# Patient Record
Sex: FEMALE | Race: WHITE | NOT HISPANIC OR LATINO | URBAN - METROPOLITAN AREA
[De-identification: names, ages, dates, MRNs, and addresses within clinical notes are randomized per-mention and may not be internally consistent; named-entity substitution may affect disease eponyms.]

---

## 2017-01-19 ENCOUNTER — APPOINTMENT (RX ONLY)
Dept: URBAN - METROPOLITAN AREA CLINIC 170 | Facility: CLINIC | Age: 52
Setting detail: DERMATOLOGY
End: 2017-01-19

## 2017-01-19 DIAGNOSIS — Z41.9 ENCOUNTER FOR PROCEDURE FOR PURPOSES OTHER THAN REMEDYING HEALTH STATE, UNSPECIFIED: ICD-10-CM

## 2017-01-19 NOTE — HPI: OTHER
Condition:: Filler/btx-a tx
Please Describe Your Condition:: no known contraindications to soft tissue augmentation with YARELY; no known contraindications to treatment with botulinum toxin. See \"Cosmetic Procedure\" note in Attachments.

## 2022-02-17 ENCOUNTER — APPOINTMENT (RX ONLY)
Dept: URBAN - METROPOLITAN AREA CLINIC 322 | Facility: CLINIC | Age: 57
Setting detail: DERMATOLOGY
End: 2022-02-17

## 2022-02-17 DIAGNOSIS — Z41.9 ENCOUNTER FOR PROCEDURE FOR PURPOSES OTHER THAN REMEDYING HEALTH STATE, UNSPECIFIED: ICD-10-CM

## 2022-02-17 PROCEDURE — ? ADDITIONAL NOTES

## 2022-02-17 ASSESSMENT — LOCATION ZONE DERM
LOCATION ZONE: EYELID
LOCATION ZONE: FACE

## 2022-02-17 ASSESSMENT — LOCATION DETAILED DESCRIPTION DERM
LOCATION DETAILED: LEFT SUPERIOR MEDIAL MALAR CHEEK
LOCATION DETAILED: RIGHT MEDIAL INFERIOR EYELID

## 2022-02-17 ASSESSMENT — LOCATION SIMPLE DESCRIPTION DERM
LOCATION SIMPLE: RIGHT INFERIOR EYELID
LOCATION SIMPLE: LEFT CHEEK

## 2022-02-17 NOTE — PROCEDURE: ADDITIONAL NOTES
Additional Notes: No charge today. Patient advised to schedule consult with Urbano Dean for further evaluation and treatment.
Render Risk Assessment In Note?: no
Detail Level: Simple

## 2022-02-17 NOTE — HPI: EVALUATION OF SKIN LESION(S)
What Type Of Note Output Would You Prefer (Optional)?: Standard Output
Hpi Title: Evaluation of Skin Lesions
How Severe Are Your Spot(S)?: mild
Have Your Spot(S) Been Treated In The Past?: has not been treated
Additional History: Pt noticed veins under eyes are darkening.

## 2022-05-26 ENCOUNTER — APPOINTMENT (RX ONLY)
Dept: URBAN - METROPOLITAN AREA CLINIC 173 | Facility: CLINIC | Age: 57
Setting detail: DERMATOLOGY
End: 2022-05-26

## 2022-05-26 DIAGNOSIS — Z41.9 ENCOUNTER FOR PROCEDURE FOR PURPOSES OTHER THAN REMEDYING HEALTH STATE, UNSPECIFIED: ICD-10-CM

## 2022-05-26 DIAGNOSIS — L81.1 CHLOASMA: ICD-10-CM

## 2022-05-26 PROCEDURE — ? COUNSELING

## 2022-05-26 PROCEDURE — ? COSMETIC CONSULTATION - PULSED-DYE LASER

## 2022-05-26 PROCEDURE — ? DIAGNOSIS COMMENT

## 2022-05-26 PROCEDURE — ? COSMETIC CONSULTATION: BROWN SPOTS

## 2022-05-26 ASSESSMENT — LOCATION SIMPLE DESCRIPTION DERM
LOCATION SIMPLE: LEFT FOREHEAD
LOCATION SIMPLE: RIGHT TEMPLE

## 2022-05-26 ASSESSMENT — LOCATION ZONE DERM: LOCATION ZONE: FACE

## 2022-05-26 ASSESSMENT — LOCATION DETAILED DESCRIPTION DERM
LOCATION DETAILED: LEFT SUPERIOR FOREHEAD
LOCATION DETAILED: RIGHT CENTRAL TEMPLE

## 2022-05-26 NOTE — PROCEDURE: COUNSELING
Detail Level: Zone
Patient Specific Counseling (Will Not Stick From Patient To Patient): **Patient has tried prescription bleaching creams and does not wish to pursue topical tx.

## 2022-05-26 NOTE — PROCEDURE: DIAGNOSIS COMMENT
Comment: Patient with solar lentigines diffusely on face and subtle melasma. Recommended full face 1927 (10/1/4).  Patient also with pigmentation under eye (L>R).  Discussed fraxel would also help this.  Patient has tried bleaching creams in the past which did not help. Patient may only want to treat patch on left upper forehead given this spot bothers her the most. Also discussed Nd:Yag for blue veins under eyes if this bothers patient.  Patient also with erythema on left cheek. Could treat this spot with PDL.
Render Risk Assessment In Note?: no
Detail Level: Simple

## 2022-08-25 ENCOUNTER — APPOINTMENT (RX ONLY)
Dept: URBAN - METROPOLITAN AREA CLINIC 173 | Facility: CLINIC | Age: 57
Setting detail: DERMATOLOGY
End: 2022-08-25

## 2022-08-25 DIAGNOSIS — Z41.9 ENCOUNTER FOR PROCEDURE FOR PURPOSES OTHER THAN REMEDYING HEALTH STATE, UNSPECIFIED: ICD-10-CM

## 2022-08-25 PROCEDURE — ? DIAGNOSIS COMMENT

## 2022-08-25 PROCEDURE — ? FRAXEL

## 2022-08-25 PROCEDURE — ? PULSED-DYE LASER

## 2022-08-25 PROCEDURE — ? EXCEL HR

## 2022-08-25 ASSESSMENT — LOCATION SIMPLE DESCRIPTION DERM
LOCATION SIMPLE: LEFT FOREHEAD
LOCATION SIMPLE: LEFT INFERIOR EYELID
LOCATION SIMPLE: RIGHT INFERIOR EYELID

## 2022-08-25 ASSESSMENT — LOCATION DETAILED DESCRIPTION DERM
LOCATION DETAILED: LEFT MEDIAL INFERIOR EYELID
LOCATION DETAILED: LEFT SUPERIOR FOREHEAD
LOCATION DETAILED: RIGHT MEDIAL INFERIOR EYELID

## 2022-08-25 ASSESSMENT — LOCATION ZONE DERM
LOCATION ZONE: FACE
LOCATION ZONE: EYELID

## 2022-08-25 NOTE — PROCEDURE: EXCEL HR
Total Pulses: 22
Laser Type: 1064 nm
Treatment Number: 1
Spot Size: 4 mm
Consent: Written consent obtained, risks reviewed including but not limited to crusting, scabbing, blistering, scarring, darker or lighter pigmentary change, and/or incomplete removal.
Repetition Rate: 1.5 Hz
Fluence In J/Cm2: 125
Cooling: chill tip
Number Of Prepaid Treatments (Will Not Render If 0): 0
External Cooling Fan Speed: 5
Post-Care Instructions: I reviewed with the patient in detail post-care instructions. Patient is to apply vaseline with a q-tip to all crusted areas, and avoid picking at any scabs. Pt should stay away from the sun and wear sun protection until fully healed.
Immediate Endpoint Findings: no edema or erythema
Price (Use Numbers Only, No Special Characters Or $): 150
Detail Level: Zone
Post-Procedure Text: Vaseline and ice applied. Post care reviewed with patient.
Pulse Width In Msec: 40

## 2022-08-25 NOTE — PROCEDURE: DIAGNOSIS COMMENT
Detail Level: Simple
Comment: Patient with melasma, blue vein on forehead and subtle erythema under eyes (vascular congestion) and on cheeks. Will treat with light fraxel, 1064nm for blue veins and PDL for erythema. Patient would like to do a check-in in 1 month to assess today’s treatment prior to scheduling additional treatments.
Render Risk Assessment In Note?: no

## 2022-08-25 NOTE — PROCEDURE: FRAXEL
Number Of Passes: 1
Post-Care Instructions: I reviewed with the patient in detail post-care instructions. Patient should avoid sun until area fully healed.
Was An Eye Shield Used?: No
Medium Metal Eye Shield Text: The ocular mucosa was anesthetized with tetracaine. Once adequate anesthesia was optained, medium metal eye shields were inserted and remained in place until the procedure was completed.
Price (Use Numbers Only, No Special Characters Or $): 624
Energy(Mj/Cm2): 10
Detail Level: Zone
Large Metal Eye Shield Text: The ocular mucosa was anesthetized with tetracaine. Once adequate anesthesia was optained, large metal eye shields were inserted and remained in place until the procedure was completed.
Wavelength: 1927nm
Consent: Written consent obtained, risks reviewed including but not limited to pain and incomplete improvement.
Number Of Passes: 4
Location: neck
Indication: resurfacing
Depth In Microns (Use Numbers Only, No Special Characters Or $): 196
Small Plastic Eye Shield Text: The ocular mucosa was anesthetized with tetracaine. Once adequate anesthesia was optained, small plastic eye shields were inserted and remained in place until the procedure was completed.
Total Coverage: 20%
Location: full face
Energy(Mj/Cm2): 40
Topical Anesthesia Type: 30% lidocaine
Medium Plastic Eye Shield Text: The ocular mucosa was anesthetized with tetracaine. Once adequate anesthesia was optained, medium plastic eye shields were inserted and remained in place until the procedure was completed.
Total Energy In Kj (Optional- Don't Include Units): .94
Small Metal Eye Shield Text: The ocular mucosa was anesthetized with tetracaine. Once adequate anesthesia was optained, small metal eye shields were inserted and remained in place until the procedure was completed.
Number Of Passes: 8
Large Plastic Eye Shield Text: The ocular mucosa was anesthetized with tetracaine. Once adequate anesthesia was optained, large plastic eye shields were inserted and remained in place until the procedure was completed.
External Cooling Fan Speed: 5

## 2022-08-25 NOTE — PROCEDURE: PULSED-DYE LASER
Pulse Count (Optional): 40
Cryogen Time (Ms): 30
Spot Size: 7 mm
Location Override: vessels on forehead and mid face
Delay Time (Ms): 20
Fluence In J/Cm2 (Optional): 7.75
Post-Care Instructions: I reviewed with the patient in detail post-care instructions. Patient should stay away from the sun and wear sun protection until treated areas are fully healed.
Price (Use Numbers Only, No Special Characters Or $): 150
Fluence In J/Cm2 (Optional): 7.75
Detail Level: Zone
Treated Area: small area
Length Of Topical Anesthesia Application (Optional): 60 minutes
Cryogen Time (Ms): 30
Post-Procedure Care: Ice applied after treatment. Post care reviewed with patient.
Pulse Duration: 6 ms
Pulse Duration: 10 ms
Delay Time (Ms): 20
Immediate Endpoint: erythema
Topical Anesthesia?: 30% lidocaine, plasticized base
Spot Size: 10 mm
Consent: Written consent obtained, risks reviewed including but not limited to crusting, scabbing, blistering, scarring, darker or lighter pigmentary change, incidental hair removal, bruising, and/or incomplete removal.
Laser Type: Vbeam 595nm

## 2022-09-01 ENCOUNTER — APPOINTMENT (RX ONLY)
Dept: URBAN - METROPOLITAN AREA CLINIC 173 | Facility: CLINIC | Age: 57
Setting detail: DERMATOLOGY
End: 2022-09-01

## 2022-09-01 DIAGNOSIS — Z41.9 ENCOUNTER FOR PROCEDURE FOR PURPOSES OTHER THAN REMEDYING HEALTH STATE, UNSPECIFIED: ICD-10-CM

## 2022-09-01 PROCEDURE — ? ADDITIONAL NOTES

## 2022-09-01 PROCEDURE — ? DIAGNOSIS COMMENT

## 2022-09-01 PROCEDURE — ? PATIENT SPECIFIC COUNSELING

## 2022-09-01 ASSESSMENT — LOCATION ZONE DERM
LOCATION ZONE: FACE
LOCATION ZONE: FACE

## 2022-09-01 ASSESSMENT — LOCATION DETAILED DESCRIPTION DERM
LOCATION DETAILED: RIGHT LATERAL MALAR CHEEK
LOCATION DETAILED: RIGHT LATERAL MALAR CHEEK

## 2022-09-01 ASSESSMENT — LOCATION SIMPLE DESCRIPTION DERM
LOCATION SIMPLE: RIGHT CHEEK
LOCATION SIMPLE: RIGHT CHEEK

## 2022-09-01 NOTE — PROCEDURE: DIAGNOSIS COMMENT
Comment: Healing as expected. Still with subtle MENDs on skin. Few new acne bumps. Start BP cream. Follow-up in 2 months to potentially repeat treatment.
Render Risk Assessment In Note?: no
Detail Level: Simple

## 2022-09-01 NOTE — PROCEDURE: ADDITIONAL NOTES
Additional Notes: Discussed re evaluating the end of September. Consider a second Fraxel, MILTON, and Excel in October which is scheduled.
Detail Level: Zone
Render Risk Assessment In Note?: no

## 2022-09-23 ENCOUNTER — APPOINTMENT (RX ONLY)
Dept: URBAN - METROPOLITAN AREA CLINIC 173 | Facility: CLINIC | Age: 57
Setting detail: DERMATOLOGY
End: 2022-09-23

## 2022-09-23 DIAGNOSIS — Z41.9 ENCOUNTER FOR PROCEDURE FOR PURPOSES OTHER THAN REMEDYING HEALTH STATE, UNSPECIFIED: ICD-10-CM

## 2022-09-23 PROCEDURE — ? DIAGNOSIS COMMENT

## 2022-09-23 PROCEDURE — ? PULSED-DYE LASER

## 2022-09-23 PROCEDURE — ? COSMETIC CONSULTATION: SCLEROTHERAPY

## 2022-09-23 ASSESSMENT — LOCATION ZONE DERM
LOCATION ZONE: FACE
LOCATION ZONE: TRUNK

## 2022-09-23 ASSESSMENT — LOCATION SIMPLE DESCRIPTION DERM
LOCATION SIMPLE: LEFT CHEEK
LOCATION SIMPLE: RIGHT CHEEK
LOCATION SIMPLE: CHEST

## 2022-09-23 ASSESSMENT — LOCATION DETAILED DESCRIPTION DERM
LOCATION DETAILED: UPPER STERNUM
LOCATION DETAILED: LEFT INFERIOR CENTRAL MALAR CHEEK
LOCATION DETAILED: RIGHT INFERIOR CENTRAL MALAR CHEEK

## 2022-09-23 NOTE — PROCEDURE: PULSED-DYE LASER
Consent: Written consent obtained, risks reviewed including but not limited to crusting, scabbing, blistering, scarring, darker or lighter pigmentary change, incidental hair removal, bruising, and/or incomplete removal.
Pulse Duration: 10 ms
Cryogen Time (Ms): 30
Location Override: chest
Cryogen Time (Ms): 30
Pulse Duration: 6 ms
Post-Procedure Care: Ice applied after treatment. Post care reviewed with patient.
Spot Size: 10 mm
Delay Time (Ms): 20
Spot Size: 7 mm
Post-Care Instructions: I reviewed with the patient in detail post-care instructions. Patient should stay away from the sun and wear sun protection until treated areas are fully healed.
Price (Use Numbers Only, No Special Characters Or $): 150
Fluence In J/Cm2 (Optional): 7.75
Delay Time (Ms): 20
Fluence In J/Cm2 (Optional): 11.00
Detail Level: Zone
Pulse Count (Optional): 9
Location Override: cheeks
Pulse Count (Optional): 10
Immediate Endpoint: erythema
Spot Size: 10x3 mm
Laser Type: Vbeam 595nm
Fluence In J/Cm2 (Optional): 7.00

## 2022-09-23 NOTE — PROCEDURE: DIAGNOSIS COMMENT
Comment: Start with 1 treatment, $500 per treatment. Provided patient with handout. Ok to treat same day as Fraxel appointment. Take pictures at next appointment.
Detail Level: Simple
Render Risk Assessment In Note?: no
Comment: Some improvement after first light fraxel. Will increase settings at follow-up. Will consider Nd:Yag with eye-shields for periocular blue veins and increase settings.

## 2022-10-27 ENCOUNTER — APPOINTMENT (RX ONLY)
Dept: URBAN - METROPOLITAN AREA CLINIC 173 | Facility: CLINIC | Age: 57
Setting detail: DERMATOLOGY
End: 2022-10-27

## 2022-10-27 DIAGNOSIS — Z41.9 ENCOUNTER FOR PROCEDURE FOR PURPOSES OTHER THAN REMEDYING HEALTH STATE, UNSPECIFIED: ICD-10-CM

## 2022-10-27 DIAGNOSIS — I83.9 ASYMPTOMATIC VARICOSE VEINS OF LOWER EXTREMITIES: ICD-10-CM

## 2022-10-27 PROBLEM — I83.93 ASYMPTOMATIC VARICOSE VEINS OF BILATERAL LOWER EXTREMITIES: Status: ACTIVE | Noted: 2022-10-27

## 2022-10-27 PROCEDURE — ? PULSED-DYE LASER

## 2022-10-27 PROCEDURE — ? FRAXEL

## 2022-10-27 PROCEDURE — ? SCLEROTHERAPY (COSMETIC)

## 2022-10-27 ASSESSMENT — LOCATION DETAILED DESCRIPTION DERM
LOCATION DETAILED: UPPER STERNUM
LOCATION DETAILED: RIGHT ANTERIOR DISTAL THIGH
LOCATION DETAILED: RIGHT INFERIOR CENTRAL MALAR CHEEK
LOCATION DETAILED: LEFT ANTERIOR DISTAL THIGH
LOCATION DETAILED: LEFT INFERIOR CENTRAL MALAR CHEEK

## 2022-10-27 ASSESSMENT — LOCATION ZONE DERM
LOCATION ZONE: LEG
LOCATION ZONE: FACE
LOCATION ZONE: TRUNK

## 2022-10-27 ASSESSMENT — LOCATION SIMPLE DESCRIPTION DERM
LOCATION SIMPLE: LEFT THIGH
LOCATION SIMPLE: RIGHT CHEEK
LOCATION SIMPLE: CHEST
LOCATION SIMPLE: RIGHT THIGH
LOCATION SIMPLE: LEFT CHEEK

## 2022-10-27 NOTE — PROCEDURE: PULSED-DYE LASER
Consent: Written consent obtained, risks reviewed including but not limited to crusting, scabbing, blistering, scarring, darker or lighter pigmentary change, incidental hair removal, bruising, and/or incomplete removal.
Pulse Duration: 30 ms
Cryogen Time (Ms): 30
Location Override: cheeks
Cryogen Time (Ms): 30
Pulse Duration: 6 ms
Post-Procedure Care: Ice applied after treatment. Post care reviewed with patient.
Spot Size: 10 mm
Delay Time (Ms): 20
Spot Size: 7 mm
Post-Care Instructions: I reviewed with the patient in detail post-care instructions. Patient should stay away from the sun and wear sun protection until treated areas are fully healed.
Price (Use Numbers Only, No Special Characters Or $): 150
Fluence In J/Cm2 (Optional): 7.50
Delay Time (Ms): 20
Fluence In J/Cm2 (Optional): 12.00
Detail Level: Zone
Pulse Count (Optional): 13
Location Override: chest
Pulse Count (Optional): 8
Pulse Duration: 10 ms
Immediate Endpoint: erythema
Spot Size: 10x3 mm
Laser Type: Vbeam 595nm
Fluence In J/Cm2 (Optional): 7.75-7.50
Pulse Count (Optional): 36

## 2022-10-27 NOTE — PROCEDURE: FRAXEL
Number Of Passes: 1
Was An Eye Shield Used?: No
Price (Use Numbers Only, No Special Characters Or $): 150
Medium Plastic Eye Shield Text: The ocular mucosa was anesthetized with tetracaine. Once adequate anesthesia was optained, medium plastic eye shields were inserted and remained in place until the procedure was completed.
Total Energy In Kj (Optional- Don't Include Units): .29
Detail Level: Zone
Wavelength: 1927nm
Post-Care Instructions: I reviewed with the patient in detail post-care instructions. Patient should avoid sun until area fully healed.
Energy(Mj/Cm2): 40
Small Metal Eye Shield Text: The ocular mucosa was anesthetized with tetracaine. Once adequate anesthesia was optained, small metal eye shields were inserted and remained in place until the procedure was completed.
Large Plastic Eye Shield Text: The ocular mucosa was anesthetized with tetracaine. Once adequate anesthesia was optained, large plastic eye shields were inserted and remained in place until the procedure was completed.
Treatment Level: 4
Indication: resurfacing
Number Of Passes: 8
Medium Metal Eye Shield Text: The ocular mucosa was anesthetized with tetracaine. Once adequate anesthesia was optained, medium metal eye shields were inserted and remained in place until the procedure was completed.
Energy(Mj/Cm2): 10
Treatment Level: 5
Large Metal Eye Shield Text: The ocular mucosa was anesthetized with tetracaine. Once adequate anesthesia was optained, large metal eye shields were inserted and remained in place until the procedure was completed.
Treatment Number: 2
Location: Use Location Override
Topical Anesthesia Type: 30% lidocaine, plasticized base
Consent: Written consent obtained, risks reviewed including but not limited to pain and incomplete improvement.
Small Plastic Eye Shield Text: The ocular mucosa was anesthetized with tetracaine. Once adequate anesthesia was optained, small plastic eye shields were inserted and remained in place until the procedure was completed.
Location: neck
Location Override: Forehead
Length Of Topical Anesthesia Application (Optional): 60 minutes
Total Coverage: 40%

## 2022-10-27 NOTE — PROCEDURE: SCLEROTHERAPY (COSMETIC)
Number Of Injections (Will Not Render If 0): 0
Disposition: Compression stockings and short stretch elastic bandages were placed postoperatively.
Sclerosant (A) %: 1
Lot # A: [id number]
Post-Care Instructions: Compression for 3 weeks is critical to optimize your recovery and results. Compliance with compression helps to prevent blood clots and minimizes pain, swelling, bruising, skin discoloration (staining) and the recurrence of vessels.        Materials to gather for your wound care (all available over the counter):        Compression stockings x 2 pairs, 4 x 4 gauze, Comprilan wrap: 8 cm and 10 cm width wrap, Medical adhesive tape.       Compression and Wound care;  Leg compression for 3 weeks is servin to your success and safety. Compression at night is only needed the first day. After that, compression is needed only during waking hours.  However, if your leg feels better with compression at night, then you may continue compression at night as tolerated.       After the sclerotherapy procedure, 2 layers compression will be placed.       1. On the skin, folded or flat gauze will cover the treated areas.       2. A compression wrap (Comprilan) will be wrapped around your leg over the gauze. Once the compression wrap is in place, a compression stocking will be worn. This two layer  compression (wrap plus stocking) should be worn for the first 24 hours if tolerated.       3. After 24 hours, remove all compressions and dressings and just wear the compression stockings only during waking hours.        You will need to wear compression stockings for three weeks after your procedure, unless your physician instructs you otherwise.       Activity:       It is important NOT to be sitting or lying down for several hours after surgery. You may begin walking immediately after surgery. This is good for you, but take it easy.       For 2 days after treatment: Avoid aerobic exercise, weight training, and all other types of exertion that increase your breathing and pulse rates.       Do not get a tan for one month after sclerotherapy. Tanning increases your risk of skin discoloration.      Bathing:       After 24 hours, you may shower or bathe in tepid water, but keep the compression stocking on. Avoid immersion in hot tubs.      For pain or discomfort:       You may take acetaminophen (TylenolTM, Extra-Strength TylenolTM or DatrilTM) as directed.       Do not use aspirin, products containing aspirin, or ibuprofen (for example AdvilTM or MotrinTM) for five days after your surgery, unless approved by your physician.       Dietary restrictions: Do not drink alcoholic beverages for two days after your sclerotherapy procedure.       Possible side effects following sclerotherapy:  After sclerotherapy, mild swelling is expected. The injection sites may also become bruised or gray. You may also experience one or more of the following side effects, which almost always go away within one to four months:       Darkening of the injected veins.       Brownish staining of the skin.       Small clotted vessels under the surface of the skin that you can feel.       Bruising of the injection sites:       What to do about bruising - This will resolve within 2-3 weeks. If you wish the bruising to disappear sooner, then applying Arnicare cream (over the counter, health food stores) will help.       What to do if you feel small clotted vessels under the surface of the skin:       Call us for a follow up appointment. These small clots can be drained through a small nick.       Draining these small clots will help you heal faster and with less discoloration.       Contact your physician if you experience any of the following:       Treated areas become increasingly sore, tender, red or warm.        Acetaminophen does not relieve your discomfort.        Injection sites turn black or the skin around the site breaks down.        Ulceration of the injection sites.       You develop blisters from the tape.       You develop significant swelling or pain in the leg.       Darkening of large areas of the skin or foot.
Sclerosant Volume (Cc): 10
Sclerosant Volume (Cc): 2
Detail Level: Detailed
Consent was obtained with risks, benefits, and alternatives discussed for the above procedures.  Photographs were taken.
Expiration Date A (Month Year): 9/2024

## 2022-12-22 ENCOUNTER — APPOINTMENT (RX ONLY)
Dept: URBAN - METROPOLITAN AREA CLINIC 173 | Facility: CLINIC | Age: 57
Setting detail: DERMATOLOGY
End: 2022-12-22

## 2022-12-22 DIAGNOSIS — Z41.9 ENCOUNTER FOR PROCEDURE FOR PURPOSES OTHER THAN REMEDYING HEALTH STATE, UNSPECIFIED: ICD-10-CM

## 2022-12-22 DIAGNOSIS — L82.1 OTHER SEBORRHEIC KERATOSIS: ICD-10-CM

## 2022-12-22 PROCEDURE — ? EXCEL HR

## 2022-12-22 PROCEDURE — ? FRAXEL

## 2022-12-22 PROCEDURE — ? PULSED-DYE LASER

## 2022-12-22 PROCEDURE — ? LIQUID NITROGEN (COSMETIC)

## 2022-12-22 ASSESSMENT — LOCATION DETAILED DESCRIPTION DERM
LOCATION DETAILED: LEFT INFERIOR CENTRAL MALAR CHEEK
LOCATION DETAILED: RIGHT INFERIOR CENTRAL MALAR CHEEK
LOCATION DETAILED: UPPER STERNUM
LOCATION DETAILED: LEFT INFERIOR LATERAL FOREHEAD
LOCATION DETAILED: LEFT PROXIMAL RADIAL DORSAL FOREARM
LOCATION DETAILED: NASAL DORSUM

## 2022-12-22 ASSESSMENT — LOCATION SIMPLE DESCRIPTION DERM
LOCATION SIMPLE: CHEST
LOCATION SIMPLE: LEFT CHEEK
LOCATION SIMPLE: NOSE
LOCATION SIMPLE: LEFT FOREHEAD
LOCATION SIMPLE: LEFT FOREARM
LOCATION SIMPLE: RIGHT CHEEK

## 2022-12-22 ASSESSMENT — LOCATION ZONE DERM
LOCATION ZONE: ARM
LOCATION ZONE: FACE
LOCATION ZONE: TRUNK
LOCATION ZONE: NOSE

## 2022-12-22 NOTE — PROCEDURE: FRAXEL
Number Of Passes: 1
Was An Eye Shield Used?: No
Price (Use Numbers Only, No Special Characters Or $): 500
Medium Plastic Eye Shield Text: The ocular mucosa was anesthetized with tetracaine. Once adequate anesthesia was optained, medium plastic eye shields were inserted and remained in place until the procedure was completed.
Total Energy In Kj (Optional- Don't Include Units): 0.32
Detail Level: Zone
Wavelength: 1927nm
Post-Care Instructions: I reviewed with the patient in detail post-care instructions. Patient should avoid sun until area fully healed.
Energy(Mj/Cm2): 40
Small Metal Eye Shield Text: The ocular mucosa was anesthetized with tetracaine. Once adequate anesthesia was optained, small metal eye shields were inserted and remained in place until the procedure was completed.
Large Plastic Eye Shield Text: The ocular mucosa was anesthetized with tetracaine. Once adequate anesthesia was optained, large plastic eye shields were inserted and remained in place until the procedure was completed.
Treatment Level: 4
Indication: resurfacing
Number Of Passes: 8
Medium Metal Eye Shield Text: The ocular mucosa was anesthetized with tetracaine. Once adequate anesthesia was optained, medium metal eye shields were inserted and remained in place until the procedure was completed.
Energy(Mj/Cm2): 10
Large Metal Eye Shield Text: The ocular mucosa was anesthetized with tetracaine. Once adequate anesthesia was optained, large metal eye shields were inserted and remained in place until the procedure was completed.
Treatment Number: 3
Location: Use Location Override
Topical Anesthesia Type: 30% lidocaine, plasticized base
External Cooling Fan Speed: 5
Consent: Written consent obtained, risks reviewed including but not limited to pain and incomplete improvement.
Small Plastic Eye Shield Text: The ocular mucosa was anesthetized with tetracaine. Once adequate anesthesia was optained, small plastic eye shields were inserted and remained in place until the procedure was completed.
Location: neck
Location Override: Forehead and eyelids
Length Of Topical Anesthesia Application (Optional): 60 minutes
Total Coverage: 20%

## 2022-12-22 NOTE — PROCEDURE: LIQUID NITROGEN (COSMETIC)
Billing Information: Bill by Static Price
Spray Paint Technique: No
Post-Care Instructions: I reviewed with the patient in detail post-care instructions. Patient is to wear sunprotection, and avoid picking at any of the treated lesions. Pt may apply Vaseline to crusted or scabbing areas.
Consent: The patient's consent was obtained including but not limited to risks of crusting, scabbing, blistering, scarring, darker or lighter pigmentary change, recurrence, incomplete removal and infection. The patient understands that the procedure is cosmetic in nature and is not covered by insurance.
Spray Paint Text: The liquid nitrogen was applied to the skin utilizing a spray paint frosting technique.
Detail Level: Detailed
Show Spray Paint Technique Variable?: Yes

## 2022-12-22 NOTE — PROCEDURE: PULSED-DYE LASER
Consent: Written consent obtained, risks reviewed including but not limited to crusting, scabbing, blistering, scarring, darker or lighter pigmentary change, incidental hair removal, bruising, and/or incomplete removal.
Pulse Duration: 40 ms
Cryogen Time (Ms): 30
Location Override: cheeks
Cryogen Time (Ms): 30
Post-Procedure Care: Ice applied after treatment. Post care reviewed with patient.
Spot Size: 10x3 mm
Delay Time (Ms): 20
Spot Size: 7 mm
Post-Care Instructions: I reviewed with the patient in detail post-care instructions. Patient should stay away from the sun and wear sun protection until treated areas are fully healed.
Price (Use Numbers Only, No Special Characters Or $): 150
Delay Time (Ms): 20
Fluence In J/Cm2 (Optional): 12.50
Detail Level: Zone
Pulse Count (Optional): 36
Pulse Duration: 6 ms
Pulse Count (Location 3): 36
Location Override: chest
Pulse Duration: 10 ms
Immediate Endpoint: erythema
Laser Type: Vbeam 595nm
Fluence In J/Cm2 (Optional): 7.75- 12.50
Pulse Count (Optional): 29

## 2022-12-22 NOTE — PROCEDURE: EXCEL HR
Total Pulses: 25
Laser Type: 1064 nm
Treatment Number: 2
Spot Size: 4 mm
Consent: Written consent obtained, risks reviewed including but not limited to crusting, scabbing, blistering, scarring, darker or lighter pigmentary change, and/or incomplete removal.
Repetition Rate: 0.1 Hz
Fluence In J/Cm2: 130
Cooling: chill tip
Number Of Prepaid Treatments (Will Not Render If 0): 0
External Cooling Fan Speed: 5
Post-Care Instructions: I reviewed with the patient in detail post-care instructions. Patient is to apply vaseline with a q-tip to all crusted areas, and avoid picking at any scabs. Pt should stay away from the sun and wear sun protection until fully healed.
Immediate Endpoint Findings: no edema or erythema
Price (Use Numbers Only, No Special Characters Or $): 300
Detail Level: Zone
Post-Procedure Text: Vaseline and ice applied. Post care reviewed with patient.
Pulse Width In Msec: 40

## 2022-12-23 ENCOUNTER — RX ONLY (OUTPATIENT)
Age: 57
Setting detail: RX ONLY
End: 2022-12-23

## 2022-12-23 RX ORDER — TRETIONIN 0.25 MG/G
CREAM TOPICAL
Qty: 45 | Refills: 6 | Status: ERX | COMMUNITY
Start: 2022-12-22

## 2023-02-02 ENCOUNTER — APPOINTMENT (RX ONLY)
Dept: URBAN - METROPOLITAN AREA CLINIC 173 | Facility: CLINIC | Age: 58
Setting detail: DERMATOLOGY
End: 2023-02-02

## 2023-02-02 DIAGNOSIS — L81.4 OTHER MELANIN HYPERPIGMENTATION: ICD-10-CM

## 2023-02-02 DIAGNOSIS — Z41.9 ENCOUNTER FOR PROCEDURE FOR PURPOSES OTHER THAN REMEDYING HEALTH STATE, UNSPECIFIED: ICD-10-CM

## 2023-02-02 DIAGNOSIS — L82.1 OTHER SEBORRHEIC KERATOSIS: ICD-10-CM

## 2023-02-02 PROCEDURE — ? LIQUID NITROGEN (COSMETIC)

## 2023-02-02 PROCEDURE — ? BOTOX

## 2023-02-02 PROCEDURE — ? DIAGNOSIS COMMENT

## 2023-02-02 PROCEDURE — ? FRAXEL

## 2023-02-02 PROCEDURE — ? PRESCRIPTION

## 2023-02-02 PROCEDURE — ? PULSED-DYE LASER

## 2023-02-02 PROCEDURE — ? TREATMENT REGIMEN

## 2023-02-02 RX ORDER — PHARMACY COMPOUNDING ACCESSORY
G EACH MISCELLANEOUS NIGHTLY
Qty: 30 | Refills: 1 | Status: ERX | COMMUNITY
Start: 2023-02-02

## 2023-02-02 RX ADMIN — Medication G: at 00:00

## 2023-02-02 ASSESSMENT — LOCATION SIMPLE DESCRIPTION DERM
LOCATION SIMPLE: RIGHT PRETIBIAL REGION
LOCATION SIMPLE: RIGHT HAND
LOCATION SIMPLE: LEFT PRETIBIAL REGION

## 2023-02-02 ASSESSMENT — LOCATION DETAILED DESCRIPTION DERM
LOCATION DETAILED: RIGHT LATERAL DISTAL PRETIBIAL REGION
LOCATION DETAILED: RIGHT PROXIMAL PRETIBIAL REGION
LOCATION DETAILED: LEFT DISTAL PRETIBIAL REGION
LOCATION DETAILED: RIGHT LATERAL PROXIMAL PRETIBIAL REGION
LOCATION DETAILED: LEFT PROXIMAL PRETIBIAL REGION
LOCATION DETAILED: RIGHT RADIAL DORSAL HAND
LOCATION DETAILED: RIGHT DISTAL PRETIBIAL REGION

## 2023-02-02 ASSESSMENT — LOCATION ZONE DERM
LOCATION ZONE: LEG
LOCATION ZONE: HAND

## 2023-02-02 NOTE — PROCEDURE: DIAGNOSIS COMMENT
Detail Level: Simple
Comment: Redness on chest better but still present. Treat again today. Pigmentation on forehead improved. Fraxel forehead today and plan for full face fraxel in the Fall.
Render Risk Assessment In Note?: no
Comment: Periorbital hyperpigmentation. Some volume loss, periorbital blue veins as well as true skin pigmentation. Try lightening cream for 3 months. Consider NdYAG with eye shields at follow-up visit to address  blue veins. Could potentially try  the future.

## 2023-02-02 NOTE — PROCEDURE: FRAXEL
Energy(Mj/Cm2): 1
Small Metal Eye Shield Text: The ocular mucosa was anesthetized with tetracaine. Once adequate anesthesia was optained, small metal eye shields were inserted and remained in place until the procedure was completed.
Detail Level: Zone
Treatment Level: 4
Total Energy In Kj (Optional- Don't Include Units): 0.44
Large Plastic Eye Shield Text: The ocular mucosa was anesthetized with tetracaine. Once adequate anesthesia was optained, large plastic eye shields were inserted and remained in place until the procedure was completed.
Wavelength: 1927nm
Number Of Passes: 8
Medium Metal Eye Shield Text: The ocular mucosa was anesthetized with tetracaine. Once adequate anesthesia was optained, medium metal eye shields were inserted and remained in place until the procedure was completed.
Indication: resurfacing
Treatment Number: 3
Energy(Mj/Cm2): 10
Topical Anesthesia Type: 30% lidocaine, plasticized base
Location: neck
Consent: Written consent obtained, risks reviewed including but not limited to pain and incomplete improvement.
Large Metal Eye Shield Text: The ocular mucosa was anesthetized with tetracaine. Once adequate anesthesia was optained, large metal eye shields were inserted and remained in place until the procedure was completed.
Location: Use Location Override
Add Post-Care Below To The Note: No
Small Plastic Eye Shield Text: The ocular mucosa was anesthetized with tetracaine. Once adequate anesthesia was optained, small plastic eye shields were inserted and remained in place until the procedure was completed.
Location Override: forehead and cheeks
Total Coverage: 20%
Price (Use Numbers Only, No Special Characters Or $): 370
External Cooling Fan Speed: 5
Length Of Topical Anesthesia Application (Optional): 60 minutes
Post-Care Instructions: I reviewed with the patient in detail post-care instructions. Patient should avoid sun until area fully healed.
Medium Plastic Eye Shield Text: The ocular mucosa was anesthetized with tetracaine. Once adequate anesthesia was optained, medium plastic eye shields were inserted and remained in place until the procedure was completed.
Energy(Mj/Cm2): 40

## 2023-02-02 NOTE — PROCEDURE: BOTOX
Show Periorbital Units: Yes
[facility] Units: 0
Price (Use Numbers Only, No Special Characters Or $): 80
Additional Area 1 Units: 4
Show Ucl Units: No
Consent: Written consent obtained. Risks include but not limited to lid/brow ptosis, bruising, swelling and incomplete chemical denervation.
Lot #: [id number]
Incrementing Botox Units: By 0.5 Units
Expiration Date (Month Year): 5/2025
Additional Area 1 Location: face
Detail Level: Detailed

## 2023-02-02 NOTE — PROCEDURE: PULSED-DYE LASER
Pulse Count (Optional): 19
Pulse Duration: 10 ms
Cryogen Time (Ms): 30
Cryogen Time (Ms): 30
Price (Use Numbers Only, No Special Characters Or $): 300
Location Override: chest
Laser Type: Vbeam 595nm
Delay Time (Ms): 20
Spot Size: 7 mm
Delay Time (Ms): 20
Spot Size: 10x3 mm
Immediate Endpoint: erythema
Pulse Count (Location 2): 19
Consent: Written consent obtained, risks reviewed including but not limited to crusting, scabbing, blistering, scarring, darker or lighter pigmentary change, incidental hair removal, bruising, and/or incomplete removal.
Fluence In J/Cm2 (Optional): 7.00
Pulse Duration: 40 ms
Spot Size: 10 mm
Fluence In J/Cm2 (Optional): 13.00
Detail Level: Zone
Post-Care Instructions: I reviewed with the patient in detail post-care instructions. Patient should stay away from the sun and wear sun protection until treated areas are fully healed.
Post-Procedure Care: Ice applied after treatment. Post care reviewed with patient.

## 2023-02-02 NOTE — PROCEDURE: TREATMENT REGIMEN
Detail Level: Zone
Initiate Treatment: Start hydroquinone 8%, tretinoin 0.025%, kojic acid 1%, niacinamide 4%, fluocinolone 0.025% cream in W06 base.  Apply a thin layer nightly for 3 months.

## 2023-02-02 NOTE — PROCEDURE: LIQUID NITROGEN (COSMETIC)
Show Spray Paint Technique Variable?: Yes
Billing Information: Bill by Static Price
Spray Paint Technique: No
Price (Use Numbers Only, No Special Characters Or $): 0
Post-Care Instructions: I reviewed with the patient in detail post-care instructions. Patient is to wear sunprotection, and avoid picking at any of the treated lesions. Pt may apply Vaseline to crusted or scabbing areas.
Consent: The patient's consent was obtained including but not limited to risks of crusting, scabbing, blistering, scarring, darker or lighter pigmentary change, recurrence, incomplete removal and infection. The patient understands that the procedure is cosmetic in nature and is not covered by insurance.
Detail Level: Detailed
Spray Paint Text: The liquid nitrogen was applied to the skin utilizing a spray paint frosting technique.

## 2023-02-09 ENCOUNTER — APPOINTMENT (RX ONLY)
Dept: URBAN - METROPOLITAN AREA CLINIC 173 | Facility: CLINIC | Age: 58
Setting detail: DERMATOLOGY
End: 2023-02-09

## 2023-02-09 DIAGNOSIS — Z41.9 ENCOUNTER FOR PROCEDURE FOR PURPOSES OTHER THAN REMEDYING HEALTH STATE, UNSPECIFIED: ICD-10-CM

## 2023-02-09 PROCEDURE — ? FILLERS

## 2023-02-09 NOTE — PROCEDURE: FILLERS
Additional Area 3 Volume In Cc: 0
Include Documentation That Aspiration Was Performed Prior To Injecting Filler:: No
Lot #: [id number]
Aspiration Statement: Aspiration was performed prior to injecting site with filler.
Expiration Date (Month Year): 6/30/23
Anesthesia Volume In Cc: 0.5
Topical Anesthesia?: 30% lidocaine, 7% tetracaine
Additional Area 1 Location: Lips
Additional Anesthesia Volume In Cc: 6
Additional Area 1 Volume In Cc: 1
Detail Level: Detailed
Price (Use Numbers Only, No Special Characters Or $): 829
Filler: Restylane Refyne
Consent: Risks reviewed including but not limited to bruising, irregular texture, incomplete augmentation and procedural pain. Written consent obtained.
Map Statment: See Attach Map for Complete Details

## 2023-05-11 ENCOUNTER — APPOINTMENT (RX ONLY)
Dept: URBAN - METROPOLITAN AREA CLINIC 173 | Facility: CLINIC | Age: 58
Setting detail: DERMATOLOGY
End: 2023-05-11

## 2023-05-11 DIAGNOSIS — Z41.9 ENCOUNTER FOR PROCEDURE FOR PURPOSES OTHER THAN REMEDYING HEALTH STATE, UNSPECIFIED: ICD-10-CM

## 2023-05-11 PROCEDURE — ? FILLERS

## 2023-05-11 PROCEDURE — ? BOTOX

## 2023-05-11 PROCEDURE — ? DIAGNOSIS COMMENT

## 2023-05-11 NOTE — PROCEDURE: BOTOX
Left Periorbital Units: 0
Expiration Date (Month Year): 11/25
Show Nasal Units: Yes
Price (Use Numbers Only, No Special Characters Or $): 952
Additional Area 1 Location: face
Consent: Written consent obtained. Risks include but not limited to lid/brow ptosis, bruising, swelling and incomplete chemical denervation.
Show Ucl Units: No
Detail Level: Detailed
Lot #: [id number]
Dilution (U/0.1 Cc): 4
Incrementing Botox Units: By 0.5 Units

## 2023-05-11 NOTE — PROCEDURE: DIAGNOSIS COMMENT
Comment: Recommended skinbetter lines for lip lines
Detail Level: Simple
Render Risk Assessment In Note?: no

## 2023-05-11 NOTE — PROCEDURE: FILLERS
Dorsal Hands Filler  Volume In Cc: 0
Include Cannula Information In Note?: No
Anesthesia Volume In Cc: 0.5
Additional Area 1 Location: face
Additional Anesthesia Volume In Cc: 6
Detail Level: Detailed
Include Cannula Length?: 1.5 inch
Price (Use Numbers Only, No Special Characters Or $): 368
Consent: Risks reviewed including but not limited to bruising, irregular texture, incomplete augmentation and procedural pain. Written consent obtained.
Filler: Restylane Refyne
Map Statment: See Attach Map for Complete Details
Lot #: [id number]**JDWspecial**
Expiration Date (Month Year): 8/31/23

## 2023-07-20 ENCOUNTER — APPOINTMENT (RX ONLY)
Dept: URBAN - METROPOLITAN AREA CLINIC 173 | Facility: CLINIC | Age: 58
Setting detail: DERMATOLOGY
End: 2023-07-20

## 2023-07-20 DIAGNOSIS — Z41.9 ENCOUNTER FOR PROCEDURE FOR PURPOSES OTHER THAN REMEDYING HEALTH STATE, UNSPECIFIED: ICD-10-CM

## 2023-07-20 PROCEDURE — ? FILLERS

## 2023-07-20 PROCEDURE — ? DIAGNOSIS COMMENT

## 2023-07-20 PROCEDURE — ? BOTOX

## 2023-07-20 NOTE — PROCEDURE: FILLERS
Dorsal Hands Filler  Volume In Cc: 0
Include Cannula Information In Note?: No
Anesthesia Volume In Cc: 0.5
Additional Area 1 Location: face
Additional Anesthesia Volume In Cc: 6
Detail Level: Detailed
Include Cannula Length?: 1.5 inch
Price (Use Numbers Only, No Special Characters Or $): 639
Consent: Risks reviewed including but not limited to bruising, irregular texture, incomplete augmentation and procedural pain. Written consent obtained.
Filler: Restylane Refyne
Map Statment: See Attach Map for Complete Details
Lot #: [id number]**JDWspecial**
Expiration Date (Month Year): 02/28/24

## 2023-07-20 NOTE — PROCEDURE: BOTOX
Left Periorbital Units: 0
Expiration Date (Month Year): 12/25
Show Nasal Units: Yes
Price (Use Numbers Only, No Special Characters Or $): 562
Additional Area 1 Location: face
Consent: Written consent obtained. Risks include but not limited to lid/brow ptosis, bruising, swelling and incomplete chemical denervation.
Show Ucl Units: No
Additional Area 1 Units: 21
Detail Level: Detailed
Lot #: [id number]
Dilution (U/0.1 Cc): 4
Incrementing Botox Units: By 0.5 Units

## 2023-10-31 ENCOUNTER — APPOINTMENT (RX ONLY)
Dept: URBAN - METROPOLITAN AREA CLINIC 173 | Facility: CLINIC | Age: 58
Setting detail: DERMATOLOGY
End: 2023-10-31

## 2023-10-31 DIAGNOSIS — Z41.9 ENCOUNTER FOR PROCEDURE FOR PURPOSES OTHER THAN REMEDYING HEALTH STATE, UNSPECIFIED: ICD-10-CM

## 2023-10-31 PROCEDURE — ? BOTOX

## 2023-10-31 PROCEDURE — ? FILLERS

## 2023-10-31 NOTE — PROCEDURE: BOTOX
Show Additional Area 6: Yes
Additional Area 2 Units: 0
Price (Use Numbers Only, No Special Characters Or $): 465
Show Ucl Units: No
Consent: Written consent obtained. Risks include but not limited to lid/brow ptosis, bruising, swelling and incomplete chemical denervation.
Additional Area 1 Units: 78
Incrementing Botox Units: By 0.5 Units
Lot #: [id number]
Dilution (U/0.1 Cc): 4
Detail Level: Detailed
Expiration Date (Month Year): 4/26
Additional Area 1 Location: face

## 2023-10-31 NOTE — PROCEDURE: FILLERS
Additional Area 1 Location: face
Price (Use Numbers Only, No Special Characters Or $): 1500
Jawline Filler Volume In Cc: 0
Filler: RHA Redensity
Additional Area 1 Volume In Cc: 0.5
Use Map Statement For Sites (Optional): No
Consent: Risks reviewed including but not limited to bruising, irregular texture, incomplete augmentation and procedural pain. Written consent obtained.
Map Statment: See Attach Map for Complete Details
Filler: Restylane Refyne
Lot #: [id number]
Expiration Date (Month Year): 11/21/25
Lot #: [id number]
Additional Anesthesia Volume In Cc: 6
Topical Anesthesia?: 30% lidocaine, plasticized base
Additional Area 1 Volume In Cc: 1
Detail Level: Detailed
Expiration Date (Month Year): 2/28/24
Include Cannula Length?: 1.5 inch

## 2023-11-16 ENCOUNTER — APPOINTMENT (RX ONLY)
Dept: URBAN - METROPOLITAN AREA CLINIC 173 | Facility: CLINIC | Age: 58
Setting detail: DERMATOLOGY
End: 2023-11-16

## 2023-11-16 DIAGNOSIS — Z41.9 ENCOUNTER FOR PROCEDURE FOR PURPOSES OTHER THAN REMEDYING HEALTH STATE, UNSPECIFIED: ICD-10-CM

## 2023-11-16 PROCEDURE — ? DIAGNOSIS COMMENT

## 2023-11-16 PROCEDURE — ? FRAXEL

## 2023-11-16 PROCEDURE — ? PULSED-DYE LASER

## 2023-11-16 PROCEDURE — ? BOTOX

## 2023-11-16 NOTE — PROCEDURE: BOTOX
Show Forehead Units: Yes
Lcl Root Units: 0
Detail Level: Detailed
Show Right And Left Pupillary Line Units: No
Additional Area 1 Location: face
Expiration Date (Month Year): 04/2026
Additional Area 1 Units: 1
Consent: Written consent obtained. Risks include but not limited to lid/brow ptosis, bruising, swelling and incomplete chemical denervation.
Lot #: [id number] *JAMAN Special*
Dilution (U/0.1 Cc): 4
Incrementing Botox Units: By 0.5 Units

## 2023-11-16 NOTE — PROCEDURE: PULSED-DYE LASER
Pulse Count (Optional): 21
Pulse Duration: 10 ms
Cryogen Time (Ms): 30
Cryogen Time (Ms): 30
Price (Use Numbers Only, No Special Characters Or $): 0
Location Override: chest
Laser Type: Vbeam 595nm
Delay Time (Ms): 20
Spot Size: 7 mm
Delay Time (Ms): 20
Spot Size: 10x3 mm
Immediate Endpoint: erythema
Pulse Count (Optional): 19
Consent: Written consent obtained, risks reviewed including but not limited to crusting, scabbing, blistering, scarring, darker or lighter pigmentary change, incidental hair removal, bruising, and/or incomplete removal.
Fluence In J/Cm2 (Optional): 7.00
Pulse Duration: 40 ms
Spot Size: 10 mm
Fluence In J/Cm2 (Optional): 12.75
Detail Level: Zone
Post-Care Instructions: I reviewed with the patient in detail post-care instructions. Patient should stay away from the sun and wear sun protection until treated areas are fully healed.
Post-Procedure Care: Ice applied after treatment. Post care reviewed with patient.

## 2023-11-16 NOTE — PROCEDURE: DIAGNOSIS COMMENT
Detail Level: Simple
Comment: Patient feels improvement on both chest from PDL and face from fraxel, but still feels the need for additional treatments. Patient wants to start fraxel treatment on chest & neck today. Patient feels since last Botox treatment, her smile on the left side has felt droopy. Will address today at this visit. ***plan to not treat lower mouth Botox in future***\\n\\n\\nPdl + fraxel $1950-$750=$1200 (discounted neck & chest fraxel)
Render Risk Assessment In Note?: no

## 2023-11-16 NOTE — PROCEDURE: FRAXEL
Energy(Mj/Cm2): 1
Total Energy In Kj (Optional- Don't Include Units): 0.77
Small Metal Eye Shield Text: The ocular mucosa was anesthetized with tetracaine. Once adequate anesthesia was optained, small metal eye shields were inserted and remained in place until the procedure was completed.
Detail Level: Zone
Treatment Level: 6
Total Energy In Kj (Optional- Don't Include Units): 0.91
Large Plastic Eye Shield Text: The ocular mucosa was anesthetized with tetracaine. Once adequate anesthesia was optained, large plastic eye shields were inserted and remained in place until the procedure was completed.
Wavelength: 1550nm
Wavelength: 1927nm
Location: Use Location Override
Number Of Passes: 8
Tip: 15mm
Total Coverage: 17%
Medium Metal Eye Shield Text: The ocular mucosa was anesthetized with tetracaine. Once adequate anesthesia was optained, medium metal eye shields were inserted and remained in place until the procedure was completed.
Location Override: neck & chest
Indication: resurfacing
Depth In Microns (Use Numbers Only, No Special Characters Or $): 7862
Energy(Mj/Cm2): 10
Treatment Number: 4
Topical Anesthesia Type: 30% lidocaine, plasticized base
Total Energy In Kj (Optional- Don't Include Units): 0.05
Consent: Written consent obtained, risks reviewed including but not limited to pain and incomplete improvement.
Large Metal Eye Shield Text: The ocular mucosa was anesthetized with tetracaine. Once adequate anesthesia was optained, large metal eye shields were inserted and remained in place until the procedure was completed.
Location: full face
Add Post-Care Below To The Note: No
Small Plastic Eye Shield Text: The ocular mucosa was anesthetized with tetracaine. Once adequate anesthesia was optained, small plastic eye shields were inserted and remained in place until the procedure was completed.
Depth In Microns (Use Numbers Only, No Special Characters Or $): 196
Total Coverage: 35%
Total Coverage: 20%
Price (Use Numbers Only, No Special Characters Or $): 1200
External Cooling Fan Speed: 5
Location Override: upper lip
Depth In Microns (Use Numbers Only, No Special Characters Or $): 196
Length Of Topical Anesthesia Application (Optional): 60 minutes
Post-Care Instructions: I reviewed with the patient in detail post-care instructions. Patient should avoid sun until area fully healed.
Medium Plastic Eye Shield Text: The ocular mucosa was anesthetized with tetracaine. Once adequate anesthesia was optained, medium plastic eye shields were inserted and remained in place until the procedure was completed.
Energy(Mj/Cm2): 50

## 2024-02-15 ENCOUNTER — APPOINTMENT (RX ONLY)
Dept: URBAN - METROPOLITAN AREA CLINIC 173 | Facility: CLINIC | Age: 59
Setting detail: DERMATOLOGY
End: 2024-02-15

## 2024-02-15 DIAGNOSIS — Z41.9 ENCOUNTER FOR PROCEDURE FOR PURPOSES OTHER THAN REMEDYING HEALTH STATE, UNSPECIFIED: ICD-10-CM

## 2024-02-15 DIAGNOSIS — L57.0 ACTINIC KERATOSIS: ICD-10-CM

## 2024-02-15 PROCEDURE — ? PRESCRIPTION

## 2024-02-15 PROCEDURE — ? FILLERS

## 2024-02-15 PROCEDURE — ? BOTOX

## 2024-02-15 RX ORDER — FLUOROURACIL 2 G/40G
CREAM TOPICAL
Qty: 40 | Refills: 1 | Status: ERX | COMMUNITY
Start: 2024-02-15

## 2024-02-15 RX ADMIN — FLUOROURACIL: 2 CREAM TOPICAL at 00:00

## 2024-02-15 NOTE — PROCEDURE: FILLERS
Cheeks Filler Volume In Cc: 0
Use Map Statement For Sites (Optional): No
Filler: Restylane Refyne
Map Statment: See Attach Map for Complete Details
Lot #: [id number]
Expiration Date (Month Year): 04/30/2024
Consent: Risks reviewed including but not limited to bruising, irregular texture, incomplete augmentation and procedural pain. Written consent obtained.
Anesthesia Volume In Cc: 0.5
Additional Area 1 Location: lips
Include Cannula Length?: 1.5 inch
Additional Anesthesia Volume In Cc: 6
Detail Level: Detailed
Additional Anesthesia Volume In Cc: 5.1
Topical Anesthesia?: 30% lidocaine, plasticized base
Price (Use Numbers Only, No Special Characters Or $): 428

## 2024-02-15 NOTE — PROCEDURE: BOTOX
Lateral Platysmal Bands Units: 0
Show Orbicularis Oculi Units: Yes
Show Right And Left Pupillary Line Units: No
Expiration Date (Month Year): 4/26
Price (Use Numbers Only, No Special Characters Or $): 125
Consent: Written consent obtained. Risks include but not limited to lid/brow ptosis, bruising, swelling and incomplete chemical denervation.
Lot #: [id number]
Additional Area 1 Location: face
Incrementing Botox Units: By 0.5 Units
Dilution (U/0.1 Cc): 2
Additional Area 1 Units: 78
Detail Level: Detailed

## 2024-03-13 ENCOUNTER — APPOINTMENT (RX ONLY)
Dept: URBAN - METROPOLITAN AREA CLINIC 170 | Facility: CLINIC | Age: 59
Setting detail: DERMATOLOGY
End: 2024-03-13

## 2024-03-13 DIAGNOSIS — L57.0 ACTINIC KERATOSIS: ICD-10-CM

## 2024-03-13 PROCEDURE — ? DEFER

## 2024-03-13 PROCEDURE — 99213 OFFICE O/P EST LOW 20 MIN: CPT

## 2024-03-13 PROCEDURE — ? COUNSELING

## 2024-03-13 ASSESSMENT — LOCATION ZONE DERM: LOCATION ZONE: FACE

## 2024-03-13 ASSESSMENT — LOCATION DETAILED DESCRIPTION DERM: LOCATION DETAILED: RIGHT INFERIOR FOREHEAD

## 2024-03-13 ASSESSMENT — LOCATION SIMPLE DESCRIPTION DERM: LOCATION SIMPLE: RIGHT FOREHEAD

## 2024-03-13 NOTE — HPI: SKIN LESION
What Type Of Note Output Would You Prefer (Optional)?: Bullet Format
Is This A New Presentation, Or A Follow-Up?: Skin Lesion
Additional History: Patient was seen by Dr. Lu in February. She diagnosed lesion as an AK and prescribed efudex. Patient has not applied cream due to social obligations. She is here to confirm diagnosis.

## 2024-03-13 NOTE — PROCEDURE: DEFER
Size Of Lesion In Cm (Optional): 0
Introduction Text (Please End With A Colon): The following procedure was deferred:
Procedure To Be Performed At Next Visit: Cryotherapy
Instructions (Optional): Patient is having a Fraxel laser done tomorrow, and will treat lesion after she is healed from the laser.
Detail Level: Simple

## 2024-03-14 ENCOUNTER — APPOINTMENT (RX ONLY)
Dept: URBAN - METROPOLITAN AREA CLINIC 173 | Facility: CLINIC | Age: 59
Setting detail: DERMATOLOGY
End: 2024-03-14

## 2024-03-14 DIAGNOSIS — Z41.9 ENCOUNTER FOR PROCEDURE FOR PURPOSES OTHER THAN REMEDYING HEALTH STATE, UNSPECIFIED: ICD-10-CM

## 2024-03-14 PROCEDURE — ? DIAGNOSIS COMMENT

## 2024-03-14 PROCEDURE — ? FRAXEL

## 2024-03-14 PROCEDURE — ? PULSED-DYE LASER

## 2024-03-14 NOTE — PROCEDURE: PULSED-DYE LASER
Pulse Count: 16
Pulse Duration: 10 ms
Cryogen Time (Ms): 30
Price (Use Numbers Only, No Special Characters Or $): 0
Location Override: chest
Laser Type: Vbeam 595nm
Delay Time (Ms): 20
Spot Size: 7 mm
Spot Size: 10x3 mm
Immediate Endpoint: erythema
Consent: Written consent obtained, risks reviewed including but not limited to crusting, scabbing, blistering, scarring, darker or lighter pigmentary change, incidental hair removal, bruising, and/or incomplete removal.
Fluence In J/Cm2 (Optional): 7.00
Pulse Duration: 40 ms
Spot Size: 10 mm
Fluence In J/Cm2 (Optional): 11.50
Detail Level: Zone
Post-Care Instructions: I reviewed with the patient in detail post-care instructions. Patient should stay away from the sun and wear sun protection until treated areas are fully healed.
Post-Procedure Care: Ice applied after treatment. Post care reviewed with patient.

## 2024-03-14 NOTE — PROCEDURE: FRAXEL
Energy(Mj/Cm2): 1
Total Energy In Kj (Optional- Don't Include Units): 0.77
Small Metal Eye Shield Text: The ocular mucosa was anesthetized with tetracaine. Once adequate anesthesia was optained, small metal eye shields were inserted and remained in place until the procedure was completed.
Detail Level: Zone
Treatment Level: 6
Total Energy In Kj (Optional- Don't Include Units): 0.87
Large Plastic Eye Shield Text: The ocular mucosa was anesthetized with tetracaine. Once adequate anesthesia was optained, large plastic eye shields were inserted and remained in place until the procedure was completed.
Wavelength: 1550nm
Wavelength: 1927nm
Location: Use Location Override
Number Of Passes: 8
Tip: 15mm
Total Coverage: 17%
Medium Metal Eye Shield Text: The ocular mucosa was anesthetized with tetracaine. Once adequate anesthesia was optained, medium metal eye shields were inserted and remained in place until the procedure was completed.
Indication: resurfacing
Depth In Microns (Use Numbers Only, No Special Characters Or $): 7045
Energy(Mj/Cm2): 10
Treatment Number: 5
Number Of Passes: 4
Topical Anesthesia Type: 30% lidocaine, plasticized base
Total Energy In Kj (Optional- Don't Include Units): 0.10
Consent: Written consent obtained, risks reviewed including but not limited to pain and incomplete improvement.
Large Metal Eye Shield Text: The ocular mucosa was anesthetized with tetracaine. Once adequate anesthesia was optained, large metal eye shields were inserted and remained in place until the procedure was completed.
Location: full face
Add Post-Care Below To The Note: No
Small Plastic Eye Shield Text: The ocular mucosa was anesthetized with tetracaine. Once adequate anesthesia was optained, small plastic eye shields were inserted and remained in place until the procedure was completed.
Depth In Microns (Use Numbers Only, No Special Characters Or $): 196
Total Coverage: 35%
Total Coverage: 20%
Price (Use Numbers Only, No Special Characters Or $): 018
Location Override: upper lip
Length Of Topical Anesthesia Application (Optional): 45 minutes
Post-Care Instructions: I reviewed with the patient in detail post-care instructions. Patient should avoid sun until area fully healed.
Medium Plastic Eye Shield Text: The ocular mucosa was anesthetized with tetracaine. Once adequate anesthesia was optained, medium plastic eye shields were inserted and remained in place until the procedure was completed.
Energy(Mj/Cm2): 60

## 2024-03-21 ENCOUNTER — APPOINTMENT (RX ONLY)
Dept: URBAN - METROPOLITAN AREA CLINIC 173 | Facility: CLINIC | Age: 59
Setting detail: DERMATOLOGY
End: 2024-03-21

## 2024-03-21 DIAGNOSIS — Z41.9 ENCOUNTER FOR PROCEDURE FOR PURPOSES OTHER THAN REMEDYING HEALTH STATE, UNSPECIFIED: ICD-10-CM

## 2024-03-21 PROCEDURE — ? BOTOX

## 2024-03-21 PROCEDURE — ? FILLERS

## 2024-03-21 NOTE — PROCEDURE: FILLERS
Jawline Filler Volume In Cc: 0
Use Map Statement For Sites (Optional): No
Filler: Juvederm Vollure XC
Map Statment: See Attach Map for Complete Details
Lot #: [id number] *JDW special*
Expiration Date (Month Year): 2/16/2025
Anesthesia Volume In Cc: 0.5
Additional Anesthesia Volume In Cc: 6
Include Cannula Length?: 1.5 inch
Topical Anesthesia?: 30% lidocaine, plasticized base
Additional Area 1 Location: face
Detail Level: Detailed
Consent: Risks reviewed including but not limited to bruising, irregular texture, incomplete augmentation and procedural pain. Written consent obtained.
Price (Use Numbers Only, No Special Characters Or $): 450

## 2024-03-21 NOTE — PROCEDURE: BOTOX
Left Periorbital Skin Units: 0
Show Nasal Units: Yes
Detail Level: Detailed
Expiration Date (Month Year): 05/2026
Consent: Written consent obtained. Risks include but not limited to lid/brow ptosis, bruising, swelling and incomplete chemical denervation.
Show Ucl Units: No
Lot #: [id number]*JAMAN special*
Dilution (U/0.1 Cc): 5
Additional Area 1 Location: face
Incrementing Botox Units: By 0.5 Units

## 2024-06-06 ENCOUNTER — APPOINTMENT (RX ONLY)
Dept: URBAN - METROPOLITAN AREA CLINIC 173 | Facility: CLINIC | Age: 59
Setting detail: DERMATOLOGY
End: 2024-06-06

## 2024-06-06 DIAGNOSIS — Z41.9 ENCOUNTER FOR PROCEDURE FOR PURPOSES OTHER THAN REMEDYING HEALTH STATE, UNSPECIFIED: ICD-10-CM

## 2024-06-06 PROCEDURE — ? COSMETIC CONSULTATION: FRACTIONAL RESURFACING

## 2024-06-06 PROCEDURE — ? FILLERS

## 2024-06-06 PROCEDURE — ? BOTOX

## 2024-06-06 PROCEDURE — ? DIAGNOSIS COMMENT

## 2024-06-06 NOTE — PROCEDURE: DIAGNOSIS COMMENT
Render Risk Assessment In Note?: no
Detail Level: Simple
Comment: Patient not completed satisfied with chest laser treatments. Discussed after summer can use a fine tip electrodessication treatment.

## 2024-06-06 NOTE — PROCEDURE: FILLERS
Jawline Filler Volume In Cc: 0
Use Map Statement For Sites (Optional): No
Filler: RHA 2
Map Statment: See Attach Map for Complete Details
Lot #: [id number]
Expiration Date (Month Year): 06/23/2026
Include Cannula Length?: 1.5 inch
Topical Anesthesia?: 30% lidocaine, plasticized base
Additional Area 1 Location: face
Additional Area 1 Volume In Cc: 1
Detail Level: Detailed
Consent: Risks reviewed including but not limited to bruising, irregular texture, incomplete augmentation and procedural pain. Written consent obtained.
Price (Use Numbers Only, No Special Characters Or $): 934

## 2024-06-06 NOTE — PROCEDURE: BOTOX
Left Periorbital Skin Units: 0
Show Nasal Units: Yes
Detail Level: Detailed
Expiration Date (Month Year): 06/2026
Price (Use Numbers Only, No Special Characters Or $): 7194
Consent: Written consent obtained. Risks include but not limited to lid/brow ptosis, bruising, swelling and incomplete chemical denervation.
Show Ucl Units: No
Lot #: [id number]
Dilution (U/0.1 Cc): 5
Additional Area 1 Location: face
Additional Area 1 Units: 84
Incrementing Botox Units: By 0.5 Units

## 2024-09-19 ENCOUNTER — APPOINTMENT (RX ONLY)
Dept: URBAN - METROPOLITAN AREA CLINIC 173 | Facility: CLINIC | Age: 59
Setting detail: DERMATOLOGY
End: 2024-09-19

## 2024-09-19 DIAGNOSIS — Z41.9 ENCOUNTER FOR PROCEDURE FOR PURPOSES OTHER THAN REMEDYING HEALTH STATE, UNSPECIFIED: ICD-10-CM

## 2024-09-19 PROCEDURE — ? BOTOX

## 2024-09-19 PROCEDURE — ? HYALURONIDASE INJECTION

## 2024-09-19 PROCEDURE — ? FILLERS

## 2024-09-19 ASSESSMENT — LOCATION DETAILED DESCRIPTION DERM
LOCATION DETAILED: RIGHT UPPER CUTANEOUS LIP
LOCATION DETAILED: LEFT UPPER CUTANEOUS LIP

## 2024-09-19 ASSESSMENT — LOCATION ZONE DERM: LOCATION ZONE: LIP

## 2024-09-19 ASSESSMENT — LOCATION SIMPLE DESCRIPTION DERM
LOCATION SIMPLE: LEFT LIP
LOCATION SIMPLE: RIGHT LIP

## 2024-09-19 NOTE — PROCEDURE: BOTOX
Left Periorbital Skin Units: 0
Show Topical Anesthesia: Yes
Dilution (U/0.1 Cc): 2
Show Mentalis Units: No
Additional Area 1 Location: face
Incrementing Botox Units: By 0.5 Units
Additional Area 1 Units: 54
Detail Level: Detailed
Expiration Date (Month Year): 6/26
Consent: Written consent obtained. Risks include but not limited to lid/brow ptosis, bruising, swelling and incomplete chemical denervation.
Lot #: [id number] *JDW Special
Yes

## 2024-09-19 NOTE — PROCEDURE: HYALURONIDASE INJECTION
Detail Level: Detailed
Hyaluronidase Preparation: hyaluronidase
Expiration Date (Optional): 09/2026
Lot # (Optional): [id number]
Treatment Number (Optional): 1
Administered By (Optional): DUKE
Price (Use Numbers Only, No Special Characters Or $): 0
Consent: The risks of contour defects and dimpling of the skin were reviewed with the patient prior to the injection.
Total Volume (Ccs): 0.3

## 2025-01-16 ENCOUNTER — APPOINTMENT (OUTPATIENT)
Dept: URBAN - METROPOLITAN AREA CLINIC 173 | Facility: CLINIC | Age: 60
Setting detail: DERMATOLOGY
End: 2025-01-16

## 2025-01-16 DIAGNOSIS — Z41.9 ENCOUNTER FOR PROCEDURE FOR PURPOSES OTHER THAN REMEDYING HEALTH STATE, UNSPECIFIED: ICD-10-CM

## 2025-01-16 PROCEDURE — ? COSMETIC CONSULTATION - PULSED-DYE LASER

## 2025-01-16 PROCEDURE — ? BOTOX

## 2025-01-16 PROCEDURE — ? FILLERS

## 2025-01-16 NOTE — PROCEDURE: BOTOX
Additional Area 3 Units: 0
Show Masseter Units: Yes
Show Right And Left Brow Units: No
Expiration Date (Month Year): 3-27
Additional Area 1 Location: face
Price (Use Numbers Only, No Special Characters Or $): 257
Additional Area 1 Units: 54
Detail Level: Detailed
Consent: Written consent obtained. Risks include but not limited to lid/brow ptosis, bruising, swelling and incomplete chemical denervation.
Lot #: [id number] *JAMAN Special*
Incrementing Botox Units: By 0.5 Units
Dilution (U/0.1 Cc): 2

## 2025-01-16 NOTE — PROCEDURE: FILLERS
Vermilion Lips Filler Volume In Cc: 0
Include Cannula Information In Note?: No
Expiration Date (Month Year): 8/11/25
Filler: RHA Redensity
Additional Area 1 Location: face
Topical Anesthesia?: 30% lidocaine, plasticized base
Detail Level: Detailed
Lot #: [id number] *JDW Special*
Additional Area 1 Volume In Cc: 0.6
Expiration Date (Month Year): 06/23/2026
Include Cannula Length?: 1.5 inch
Price (Use Numbers Only, No Special Characters Or $): 338
Filler: Juvederm Volbella XC
Map Statment: See Attach Map for Complete Details
Additional Area 1 Volume In Cc: 0.3
Lot #: [id number] *JDW Special*
Consent: Risks reviewed including but not limited to bruising, irregular texture, incomplete augmentation and procedural pain. Written consent obtained.

## 2025-02-27 ENCOUNTER — APPOINTMENT (OUTPATIENT)
Dept: URBAN - METROPOLITAN AREA CLINIC 173 | Facility: CLINIC | Age: 60
Setting detail: DERMATOLOGY
End: 2025-02-27

## 2025-02-27 DIAGNOSIS — Z41.9 ENCOUNTER FOR PROCEDURE FOR PURPOSES OTHER THAN REMEDYING HEALTH STATE, UNSPECIFIED: ICD-10-CM

## 2025-02-27 DIAGNOSIS — L82.1 OTHER SEBORRHEIC KERATOSIS: ICD-10-CM

## 2025-02-27 PROCEDURE — ? FRAXEL

## 2025-02-27 PROCEDURE — ? PULSED-DYE LASER

## 2025-02-27 PROCEDURE — ? LIQUID NITROGEN (COSMETIC)

## 2025-02-27 PROCEDURE — ? PRO-NOX

## 2025-02-27 PROCEDURE — ? BOTOX

## 2025-02-27 ASSESSMENT — LOCATION SIMPLE DESCRIPTION DERM
LOCATION SIMPLE: CHEST
LOCATION SIMPLE: LEFT UPPER BACK
LOCATION SIMPLE: POSTERIOR NECK
LOCATION SIMPLE: LEFT LOWER BACK
LOCATION SIMPLE: RIGHT SHOULDER
LOCATION SIMPLE: LEFT FOREHEAD
LOCATION SIMPLE: RIGHT AXILLA
LOCATION SIMPLE: RIGHT THIGH
LOCATION SIMPLE: RIGHT UPPER BACK
LOCATION SIMPLE: RIGHT ZYGOMA
LOCATION SIMPLE: RIGHT CHEEK
LOCATION SIMPLE: RIGHT FOREHEAD
LOCATION SIMPLE: RIGHT LOWER BACK
LOCATION SIMPLE: ABDOMEN

## 2025-02-27 ASSESSMENT — LOCATION DETAILED DESCRIPTION DERM
LOCATION DETAILED: LEFT SUPERIOR POSTERIOR NECK
LOCATION DETAILED: RIGHT ANTERIOR PROXIMAL THIGH
LOCATION DETAILED: RIGHT SUPERIOR POSTERIOR NECK
LOCATION DETAILED: RIGHT MEDIAL SUPERIOR CHEST
LOCATION DETAILED: RIGHT LATERAL ABDOMEN
LOCATION DETAILED: LEFT SUPERIOR UPPER BACK
LOCATION DETAILED: RIGHT SUPERIOR UPPER BACK
LOCATION DETAILED: LEFT LATERAL ABDOMEN
LOCATION DETAILED: LEFT SUPERIOR LATERAL NECK
LOCATION DETAILED: RIGHT RIB CAGE
LOCATION DETAILED: RIGHT INFERIOR LATERAL MALAR CHEEK
LOCATION DETAILED: LEFT MEDIAL SUPERIOR CHEST
LOCATION DETAILED: RIGHT LATERAL ZYGOMA
LOCATION DETAILED: LEFT SUPERIOR FOREHEAD
LOCATION DETAILED: LEFT LATERAL FOREHEAD
LOCATION DETAILED: LEFT INFERIOR LATERAL MIDBACK
LOCATION DETAILED: RIGHT MID-UPPER BACK
LOCATION DETAILED: LEFT RIB CAGE
LOCATION DETAILED: RIGHT MEDIAL FOREHEAD
LOCATION DETAILED: RIGHT ANTERIOR AXILLA
LOCATION DETAILED: LEFT INFERIOR UPPER BACK
LOCATION DETAILED: RIGHT MEDIAL TRAPEZIAL NECK
LOCATION DETAILED: RIGHT INFERIOR MEDIAL MIDBACK
LOCATION DETAILED: RIGHT FOREHEAD
LOCATION DETAILED: RIGHT POSTERIOR SHOULDER

## 2025-02-27 ASSESSMENT — LOCATION ZONE DERM
LOCATION ZONE: ARM
LOCATION ZONE: AXILLAE
LOCATION ZONE: TRUNK
LOCATION ZONE: LEG
LOCATION ZONE: NECK
LOCATION ZONE: FACE

## 2025-02-27 NOTE — PROCEDURE: FRAXEL
Energy(Mj/Cm2): 1
Location: Use Location Override
Small Metal Eye Shield Text: The ocular mucosa was anesthetized with tetracaine. Once adequate anesthesia was optained, small metal eye shields were inserted and remained in place until the procedure was completed.
Total Energy In Kj (Optional- Don't Include Units): 0.71
Wavelength: 1927nm
Number Of Passes: 8
Post-Care Instructions: I reviewed with the patient in detail post-care instructions. Patient should avoid sun until area fully healed.
Treatment Level: 4
Depth In Microns (Use Numbers Only, No Special Characters Or $): 196
Indication: photodamage
Small Plastic Eye Shield Text: The ocular mucosa was anesthetized with tetracaine. Once adequate anesthesia was optained, small plastic eye shields were inserted and remained in place until the procedure was completed.
Treatment Number: 2
Total Coverage: 35%
Total Coverage: 40%
Tip: 15mm
Location Override: lower face, eyelids
Medium Metal Eye Shield Text: The ocular mucosa was anesthetized with tetracaine. Once adequate anesthesia was optained, medium metal eye shields were inserted and remained in place until the procedure was completed.
Topical Anesthesia Type: 30% lidocaine, plasticized base
Treatment Level: 6
Energy(Mj/Cm2): 60
Medium Plastic Eye Shield Text: The ocular mucosa was anesthetized with tetracaine. Once adequate anesthesia was optained, medium plastic eye shields were inserted and remained in place until the procedure was completed.
Wavelength: 1550nm
Total Energy In Kj (Optional- Don't Include Units): 0.63
Total Energy In Kj (Optional- Don't Include Units): 1.16
Depth In Microns (Use Numbers Only, No Special Characters Or $): 5655
External Cooling Fan Speed: 5
Location Override: forehead, cheeks, nose
Large Plastic Eye Shield Text: The ocular mucosa was anesthetized with tetracaine. Once adequate anesthesia was optained, large plastic eye shields were inserted and remained in place until the procedure was completed.
Consent: Written consent obtained, risks reviewed including but not limited to pain and incomplete improvement.
Energy(Mj/Cm2): 10
Location Override: chest
Price (Use Numbers Only, No Special Characters Or $): 9787
Add Post-Care Below To The Note: No
Total Coverage: 17%
Large Metal Eye Shield Text: The ocular mucosa was anesthetized with tetracaine. Once adequate anesthesia was optained, large metal eye shields were inserted and remained in place until the procedure was completed.
Detail Level: Zone

## 2025-02-27 NOTE — PROCEDURE: PRO-NOX
Consent: Written consent obtained, risks reviewed including but not limited to euphoria, light-headedness, nausea, vomiting and dizziness.
Post-Care Instructions: The patient was instructed to call the office if they had any lasting side effects or concerns.
Detail Level: Zone
Pre-Procedure Text: The patient was connected to the Pro-Nox machine via mask and positioned appropriately for the anticipated procedure.  Following the procedure they were disconnected and monitored for any lasting side effects prior to leaving the office.
Price (Use Numbers Only, No Special Characters Or $): 0

## 2025-02-27 NOTE — PROCEDURE: PULSED-DYE LASER
Cryogen Time (Ms): 30
Laser Type: Vbeam 595nm
Fluence In J/Cm2 (Optional): 8.00
Spot Size: 10x3 mm
Pulse Duration: 6 ms
Immediate Endpoint: erythema
Consent: Written consent obtained, risks reviewed including but not limited to crusting, scabbing, blistering, scarring, darker or lighter pigmentary change, incidental hair removal, bruising, and/or incomplete removal.
Delay Time (Ms): 20
Spot Size: 7 mm
Post-Procedure Care: Ice applied after treatment. Post care reviewed with patient.
Fluence In J/Cm2 (Optional): 7.25
Post-Care Instructions: I reviewed with the patient in detail post-care instructions. Patient should stay away from the sun and wear sun protection until treated areas are fully healed.
Location Override: chest
Detail Level: Zone
Pulse Duration: 1.5 ms
Price (Use Numbers Only, No Special Characters Or $): 881
Spot Size: 10 mm
Pulse Duration: 10 ms
Fluence In J/Cm2 (Optional): 12.50
Pulse Count: 4

## 2025-02-27 NOTE — PROCEDURE: LIQUID NITROGEN (COSMETIC)
Detail Level: Detailed
Render Post-Care Instructions In Note?: no
Spray Paint Text: The liquid nitrogen was applied to the skin utilizing a spray paint frosting technique.
Post-Care Instructions: I reviewed with the patient in detail post-care instructions. Patient is to wear sunprotection, and avoid picking at any of the treated lesions. Pt may apply Vaseline to crusted or scabbing areas.
Billing Information: Bill by Static Price
Show Spray Paint Technique Variable?: Yes
Price (Use Numbers Only, No Special Characters Or $): 0
Consent: The patient's consent was obtained including but not limited to risks of crusting, scabbing, blistering, scarring, darker or lighter pigmentary change, recurrence, incomplete removal and infection. The patient understands that the procedure is cosmetic in nature and is not covered by insurance.

## 2025-02-27 NOTE — PROCEDURE: BOTOX
Lcl Root Units: 0
Show Orbicularis Oculi Units: Yes
Additional Area 1 Units: 30
Show Right And Left Pupillary Line Units: No
Detail Level: Detailed
Expiration Date (Month Year): 05/2027
Price (Use Numbers Only, No Special Characters Or $): 443
Consent: Written consent obtained. Risks include but not limited to lid/brow ptosis, bruising, swelling and incomplete chemical denervation.
Lot #: [id number]
Dilution (U/0.1 Cc): 2
Incrementing Botox Units: By 0.5 Units
Additional Area 1 Location: forehead and neck

## 2025-03-20 ENCOUNTER — APPOINTMENT (OUTPATIENT)
Dept: URBAN - METROPOLITAN AREA CLINIC 173 | Facility: CLINIC | Age: 60
Setting detail: DERMATOLOGY
End: 2025-03-20

## 2025-03-20 ENCOUNTER — RX ONLY (RX ONLY)
Age: 60
End: 2025-03-20

## 2025-03-20 DIAGNOSIS — L82.1 OTHER SEBORRHEIC KERATOSIS: ICD-10-CM

## 2025-03-20 DIAGNOSIS — Z41.9 ENCOUNTER FOR PROCEDURE FOR PURPOSES OTHER THAN REMEDYING HEALTH STATE, UNSPECIFIED: ICD-10-CM

## 2025-03-20 PROCEDURE — ? DEFER

## 2025-03-20 PROCEDURE — ? BOTOX

## 2025-03-20 RX ORDER — TRETIONIN 0.25 MG/G
CREAM TOPICAL
Qty: 45 | Refills: 6 | Status: ERX

## 2025-03-20 ASSESSMENT — LOCATION DETAILED DESCRIPTION DERM
LOCATION DETAILED: RIGHT SUPERIOR LATERAL MIDBACK
LOCATION DETAILED: RIGHT INFERIOR MEDIAL FOREHEAD
LOCATION DETAILED: RIGHT MEDIAL FOREHEAD

## 2025-03-20 ASSESSMENT — LOCATION SIMPLE DESCRIPTION DERM
LOCATION SIMPLE: RIGHT FOREHEAD
LOCATION SIMPLE: RIGHT LOWER BACK

## 2025-03-20 ASSESSMENT — LOCATION ZONE DERM
LOCATION ZONE: TRUNK
LOCATION ZONE: FACE

## 2025-03-20 NOTE — PROCEDURE: BOTOX
Left Periorbital Skin Units: 0
Show Right And Left Brow Units: No
Show Depressor Anguli Units: Yes
Expiration Date (Month Year): 05/2027
Price (Use Numbers Only, No Special Characters Or $): 50
Consent: Written consent obtained. Risks include but not limited to lid/brow ptosis, bruising, swelling and incomplete chemical denervation.
Lot #: [id number] *JAMAN special*
Additional Area 1 Location: face
Incrementing Botox Units: By 0.5 Units
Dilution (U/0.1 Cc): 2
Additional Area 1 Units: 4
Detail Level: Detailed

## 2025-05-01 ENCOUNTER — APPOINTMENT (OUTPATIENT)
Dept: URBAN - METROPOLITAN AREA CLINIC 173 | Facility: CLINIC | Age: 60
Setting detail: DERMATOLOGY
End: 2025-05-01

## 2025-05-01 DIAGNOSIS — Z41.9 ENCOUNTER FOR PROCEDURE FOR PURPOSES OTHER THAN REMEDYING HEALTH STATE, UNSPECIFIED: ICD-10-CM

## 2025-05-01 DIAGNOSIS — L82.1 OTHER SEBORRHEIC KERATOSIS: ICD-10-CM

## 2025-05-01 PROCEDURE — ? LIQUID NITROGEN (COSMETIC)

## 2025-05-01 PROCEDURE — ? BOTOX

## 2025-05-01 ASSESSMENT — LOCATION DETAILED DESCRIPTION DERM
LOCATION DETAILED: LEFT FOREHEAD
LOCATION DETAILED: RIGHT INFERIOR FOREHEAD
LOCATION DETAILED: RIGHT INFERIOR LATERAL FOREHEAD

## 2025-05-01 ASSESSMENT — LOCATION SIMPLE DESCRIPTION DERM
LOCATION SIMPLE: RIGHT FOREHEAD
LOCATION SIMPLE: LEFT FOREHEAD

## 2025-05-01 ASSESSMENT — LOCATION ZONE DERM: LOCATION ZONE: FACE

## 2025-05-01 NOTE — PROCEDURE: LIQUID NITROGEN (COSMETIC)
Spray Paint Text: The liquid nitrogen was applied to the skin utilizing a spray paint frosting technique.
Show Spray Paint Technique Variable?: Yes
Spray Paint Technique: No
Billing Information: Bill by Static Price
Consent: The patient's consent was obtained including but not limited to risks of crusting, scabbing, blistering, scarring, darker or lighter pigmentary change, recurrence, incomplete removal and infection. The patient understands that the procedure is cosmetic in nature and is not covered by insurance.
Post-Care Instructions: I reviewed with the patient in detail post-care instructions. Patient is to wear sunprotection, and avoid picking at any of the treated lesions. Pt may apply Vaseline to crusted or scabbing areas.
Price (Use Numbers Only, No Special Characters Or $): 0
Detail Level: Detailed

## 2025-05-01 NOTE — PROCEDURE: BOTOX
Additional Area 3 Units: 0
Show Masseter Units: Yes
Show Right And Left Brow Units: No
Expiration Date (Month Year): 05/2027
Additional Area 1 Location: face
Price (Use Numbers Only, No Special Characters Or $): 7781
Additional Area 1 Units: 88
Detail Level: Detailed
Consent: Written consent obtained. Risks include but not limited to lid/brow ptosis, bruising, swelling and incomplete chemical denervation.
Lot #: [id number]
Incrementing Botox Units: By 0.5 Units
Dilution (U/0.1 Cc): 2

## 2025-05-05 NOTE — PROCEDURE: DEFER
Size Of Lesion In Cm (Optional): 0
Introduction Text (Please End With A Colon): The following procedure was deferred:
Detail Level: Detailed
Procedure To Be Performed At Next Visit: Cryotherapy (Cosmetic)
no

## 2025-06-12 ENCOUNTER — APPOINTMENT (OUTPATIENT)
Dept: URBAN - METROPOLITAN AREA CLINIC 173 | Facility: CLINIC | Age: 60
Setting detail: DERMATOLOGY
End: 2025-06-12

## 2025-06-12 DIAGNOSIS — Z41.9 ENCOUNTER FOR PROCEDURE FOR PURPOSES OTHER THAN REMEDYING HEALTH STATE, UNSPECIFIED: ICD-10-CM

## 2025-06-12 PROCEDURE — ? BOTOX

## 2025-06-12 PROCEDURE — ? OTHER (COSMETIC)

## 2025-06-12 NOTE — PROCEDURE: BOTOX
Anterior Platysmal Bands Units: 0
Show Nasal Units: Yes
Expiration Date (Month Year): 06/2027
Consent: Written consent obtained. Risks include but not limited to lid/brow ptosis, bruising, swelling and incomplete chemical denervation.
Show Ucl Units: No
Additional Area 1 Location: face
Lot #: [id number] *Jdw special*
Additional Area 1 Units: 5
Incrementing Botox Units: By 0.5 Units
Dilution (U/0.1 Cc): 2
Detail Level: Detailed

## 2025-06-12 NOTE — PROCEDURE: OTHER (COSMETIC)
Detail Level: Zone
Price (Use Numbers Only, No Special Characters Or $): 0
Other (Free Text): Box of Upneeq provided to patient d/t forehead feeling heavy after last Botox appointment. Will decrease forehead dose at next Botox appointment.

## 2025-06-12 NOTE — PROCEDURE: MIPS QUALITY
April 11, 2025      [facility]  [address]  [place]  [place] [address]  Phone: [phone]  Fax: [phone]       Patient: Moises Hebert   YOB: 2013  Date of Visit: 04/11/2025    To Whom It May Concern:    Nieves Hebert  was at [facility] on 04/11/2025. The patient may return to work/school once his symptoms are improving. If you have any questions or concerns, or if I can be of further assistance, please do not hesitate to contact me.    Sincerely,          Rissa Unger MD   
Quality 226: Preventive Care And Screening: Tobacco Use: Screening And Cessation Intervention: Patient screened for tobacco use and is an ex/non-smoker
Detail Level: Detailed